# Patient Record
Sex: MALE | Race: WHITE | NOT HISPANIC OR LATINO | URBAN - METROPOLITAN AREA
[De-identification: names, ages, dates, MRNs, and addresses within clinical notes are randomized per-mention and may not be internally consistent; named-entity substitution may affect disease eponyms.]

---

## 2017-10-26 ENCOUNTER — OFFICE VISIT - RIVER FALLS (OUTPATIENT)
Dept: FAMILY MEDICINE | Facility: CLINIC | Age: 32
End: 2017-10-26

## 2018-01-08 ENCOUNTER — AMBULATORY - RIVER FALLS (OUTPATIENT)
Dept: FAMILY MEDICINE | Facility: CLINIC | Age: 33
End: 2018-01-08

## 2018-05-30 ENCOUNTER — OFFICE VISIT - RIVER FALLS (OUTPATIENT)
Dept: FAMILY MEDICINE | Facility: CLINIC | Age: 33
End: 2018-05-30

## 2018-05-30 ASSESSMENT — MIFFLIN-ST. JEOR: SCORE: 2331.51

## 2018-06-11 ENCOUNTER — AMBULATORY - RIVER FALLS (OUTPATIENT)
Dept: FAMILY MEDICINE | Facility: CLINIC | Age: 33
End: 2018-06-11

## 2018-07-03 ENCOUNTER — OFFICE VISIT - RIVER FALLS (OUTPATIENT)
Dept: FAMILY MEDICINE | Facility: CLINIC | Age: 33
End: 2018-07-03

## 2018-07-03 ASSESSMENT — MIFFLIN-ST. JEOR: SCORE: 2323.35

## 2018-07-12 ENCOUNTER — AMBULATORY - RIVER FALLS (OUTPATIENT)
Dept: FAMILY MEDICINE | Facility: CLINIC | Age: 33
End: 2018-07-12

## 2018-09-04 ENCOUNTER — OFFICE VISIT - RIVER FALLS (OUTPATIENT)
Dept: FAMILY MEDICINE | Facility: CLINIC | Age: 33
End: 2018-09-04

## 2018-09-04 ASSESSMENT — MIFFLIN-ST. JEOR: SCORE: 2313.37

## 2022-02-11 VITALS
WEIGHT: 289.6 LBS | BODY MASS INDEX: 37.17 KG/M2 | HEART RATE: 64 BPM | HEIGHT: 74 IN | SYSTOLIC BLOOD PRESSURE: 124 MMHG | DIASTOLIC BLOOD PRESSURE: 72 MMHG

## 2022-02-11 VITALS
OXYGEN SATURATION: 96 % | WEIGHT: 289 LBS | SYSTOLIC BLOOD PRESSURE: 130 MMHG | HEART RATE: 84 BPM | TEMPERATURE: 98 F | BODY MASS INDEX: 37.11 KG/M2 | DIASTOLIC BLOOD PRESSURE: 86 MMHG

## 2022-02-11 VITALS
WEIGHT: 291.8 LBS | HEART RATE: 74 BPM | DIASTOLIC BLOOD PRESSURE: 62 MMHG | HEIGHT: 74 IN | BODY MASS INDEX: 37.45 KG/M2 | SYSTOLIC BLOOD PRESSURE: 114 MMHG

## 2022-02-11 VITALS
HEIGHT: 74 IN | BODY MASS INDEX: 37.68 KG/M2 | WEIGHT: 293.6 LBS | DIASTOLIC BLOOD PRESSURE: 64 MMHG | SYSTOLIC BLOOD PRESSURE: 124 MMHG | HEART RATE: 64 BPM

## 2022-02-16 NOTE — TELEPHONE ENCOUNTER
---------------------  From: Ling Palacios CMA (Phone Messages Pool ([id number]_[facility]))   To: [facility] Message Pool ([id number]);     Sent: 9/12/2019 1:50:49 PM CDT  Subject: Recall       Phone Message    PCP:  SHERIE      Time of Call:  1:34       Person Calling:  Pt  Phone number:  [phone]    Note:  Patient received recall letter for Sleep Apnea f/u.  He has moved to [place], [place] so will not be scheduling.  He asked if you had any recommendations for sleep clinics/HCP in his area.      Transferred to: [place] pool---------------------  From: Ashley Pardo CMA ([facility] Message Pool ([id number]_[facility]))   To: Spencer Bush MD;     Sent: 9/12/2019 1:51:53 PM CDT  Subject: FW: Recall---------------------  From: Spencer Bush MD   To: [facility] Message Pool ([id number]_[place]);     Sent: 9/12/2019 2:09:50 PM CDT  Subject: RE: Recall     unfortunately I [place]. He asked if I knew a way to look for a covered MD for him. I told him that he should check on insurances website. A lot of them will allow him to look up covered phyisicans in his area under his plan and that they should have a section for sleep. Pt states he will try that and if that doesn't work he will call them.

## 2022-02-16 NOTE — PROGRESS NOTES
Patient:   ANAM DOBBINS            MRN: [id number]            FIN: [id number]               Age:   32 years     Sex:  Male     :  1985   Associated Diagnoses:   Alopecia; Bronchitis; Change in mole   Author:   Anna Guerrero      Visit Information      Primary Care Provider (PCP):  Juno Greenwood MD# [id number]      Chief Complaint   10/26/2017 11:57 AM CDT  Pt c/o dry cough x 3 weeks. Thought he had flu at the start of sx.        History of Present Illness   PPC for evaluation of dry hacking cough present for 3 weeks, fever at start but no fever for 2 weeks  does not smoke, no hx of asthma, no hx of blood clots, only travel was 4 weeks ago when he went to [place] for [place]TI dept but no known exposures, no small children at home    in addition, has had alopecia for 7yrs, has mole on top of left ear which he was told to watch, no changes to this lesion  however, has mole in right axilla which is larger, no bleeding      Review of Systems   Constitutional:  Negative.    Eye:  Negative.    Ear/Nose/Mouth/Throat:  Nasal congestion, Sore throat.    Respiratory:  Cough, Wheezing.    Cardiovascular:  Negative.    Gastrointestinal:  Negative.    Hematology/Lymphatics:  Negative.    Immunologic:  Negative.    Musculoskeletal:  Negative.    Integumentary:  Negative except as documented in history of present illness.    Neurologic:  Negative.    Psychiatric:  Negative.             Health Status   Allergies:    Allergic Reactions (Selected)  Severity Not Documented  Amoxicillin (No reactions were documented)  Cats (No reactions were documented)  Dogs (No reactions were documented)  Penicillin (No reactions were documented)   Medications:  (Selected)   Prescriptions  Prescribed  Zithromax 250 mg oral tablet: 1 packet(s), PO, Once, Instructions: as directed on package labeling, # 6 tab(s), 0 Refill(s), Type: Soft Stop, Pharmacy: [id number], 1 packet(s) po once,Instr:as directed  on package labeling  Documented Medications  Documented  FISH OIL ORAL CAPSULE ([id number]): UNKNOWNUNIT, Not Listed, 0 Refill(s)  Vitamin D3: po, daily, 0 Refill(s), Type: Maintenance      Histories   Family History:    Diabetes mellitus  Father        Physical Examination   Vital Signs   10/26/2017 11:57 AM CDT Temperature Tympanic 98.0 DegF    Peripheral Pulse Rate 84 bpm    Pulse Site Radial artery    HR Method Manual    Systolic Blood Pressure 130 mmHg    Diastolic Blood Pressure 86 mmHg    Mean Arterial Pressure 101 mmHg    BP Site Right arm    BP Method Manual    Oxygen Saturation 96 %      Measurements from flowsheet : Measurements   10/26/2017 11:57 AM CDT  Weight Measured - Standard                289 lb     General:  Alert and oriented, No acute distress.    Eye:  Pupils are equal, round and reactive to light.    HENT:  Normocephalic.         Head: normocephalic.         Ear: Both ears, Middle ear, Tympanic membrane ( Fluid in middle ear, bilaterally ).         Nose: Both nostrils, erythematous, clear discharge.         Sinus: Bilateral, Frontal sinus, Maxillary sinus, Within normal limits.         Mouth: Within normal limits.         Throat: Pharynx ( Erythematous, moderate amount clear post nasal discharge ).         Glands: Bilateral.    Neck:  Supple.    Respiratory:       Breath sounds: Bilateral, Posterior, Lower lobe, Base, Expiratory wheezes, No inspiratory wheezes.    Cardiovascular:  Normal rate, Regular rhythm.    Gastrointestinal:  Soft, Non-tender.    Integumentary:  Warm, Dry, Pink, 4mm oval mole on top of left ear pinnacle, tan   right axillar with 3mm raised dark tan mole and 4mm surrounding tan skin change which is macular and irregular.    Neurologic:  Alert, Oriented, Normal sensory.    Psychiatric:  Cooperative, Appropriate mood & affect.       Impression and Plan   Diagnosis     Alopecia (GQL94-NM L65.9).     Bronchitis (WOH26-HR J40).     Change in mole (WJX98-IV L81.9).     Patient  "Instructions:       Counseled: Patient, Regarding diagnosis, Regarding treatment, Regarding medications, Regarding activity, Verbalized understanding.    Summary:  discussed interventions for wheezing does not want an inhaler or steroids at this time, \"this happened about ten years ago and zithromax was enough\"  discussed limitations with zithromax as most of bronchitis cases are viral but has he had traveled 4 weeks ago I am unsure of exposures  will send him to dermatology to establish care, important with alopecia to have moles evaluated..    Orders     Orders (Selected)   Prescriptions  Prescribed  Zithromax 250 mg oral tablet: 1 packet(s), PO, Once, Instructions: as directed on package labeling, # 6 tab(s), 0 Refill(s), Type: Soft Stop, Pharmacy: [id number], 1 packet(s) po once,Instr:as directed on package labeling.  "

## 2022-02-16 NOTE — LETTER
(Inserted Image. Unable to display)       September 05, 2019      ANAM DOBBINS  [address] [place], [place] [id number]          Dear ANAM,      Thank you for selecting [facility] for your healthcare needs.     Our records indicate you are due for the following services:     Sleep Apnea Follow up ~ It is recommended, and possibly required by your insurance plan, that you see one of our sleep physicians, Dr. Manny Bey or Dr. Kalin Bush, on a yearly basis for your sleep apnea.  To determine whether you are benefiting from your PAP therapy, a download of your machine will be needed.  We may be able to obtain the download remotely; however, to ensure this information will be available for your visit, please bring your CPAP machine and SD card to your visit.    Appointments with our sleep physicians can be made by calling your primary clinic.    To schedule an appointment or if you have further questions, please contact your primary clinic:   [facility]       [phone]   [facility]       [phone]              [facility]     [phone]    Powered by [employer]    Sincerely,    Healthcare Providers at [facility]

## 2022-02-16 NOTE — PROGRESS NOTES
Patient:   ANAM DOBBINS            MRN: [id number]            FIN: [id number]               Age:   32 years     Sex:  Male     :  1985   Associated Diagnoses:   Witnessed episode of apnea; Obesity; Excessive daytime sleepiness; Snoring   Author:   Spencer Bush MD      Chief Complaint   2018 8:04 AM CDT    c/o snoring , wakes up a lot during the night, has had witnessed apnea, tired during the day.      History of Present Illness   see chief complaint as noted above and confirmed with the patient   32 year old male presenting with consult for sleep. His fiance notices that he stops breathing in his sleep. He wakes up anywhere from 1-5 times most nights. Nights he does sleep fully through without waking he still feels very tired when he wakes up and throughout the day. He snores loudly on his back. He is getting  in 6 months and plans to loose weight.      Review of Systems   Constitutional:  No fever, No chills.    Ear/Nose/Mouth/Throat:  Negative.    Respiratory:  Apnea, Snoring, No shortness of breath, No cough.    Cardiovascular:  No chest pain.    Gastrointestinal:  No nausea, No vomiting.    Musculoskeletal:  No back pain.    Integumentary:  No rash.    Neurologic:  No headache.    Psychiatric:  Negative.              Health Status   Allergies:    Allergic Reactions (Selected)  Severity Not Documented  Amoxicillin (No reactions were documented)  Cats (No reactions were documented)  Dogs (No reactions were documented)  Penicillin (No reactions were documented)   Medications:  (Selected)   Documented Medications  Documented  FISH OIL ORAL CAPSULE ([id number]): UNKNOWNUNIT, Not Listed, 0 Refill(s)  Vitamin D3: po, daily, 0 Refill(s), Type: Maintenance   Problem list:    All Problems  Plantar Wart / ICD-9-.12 / Confirmed  Other and Unspecified Hyperlipidemia / ICD-9-.4 / Confirmed  Other Abnormal Blood Chemistry / ICD-9-.6 / Confirmed  Obesity, Unspecified / ICD-9-.00 /  Probable  Colitis, Enteritis, and Gastroenteritis of Presumed Infectious Origin / ICD-9-.1 / Confirmed  Allergy, Unspecified, Not Elsewhere Classified / ICD-9-.3 / Confirmed      Histories   Past Medical History:    Active  Allergy, Unspecified, Not Elsewhere Classified (995.3)  Comments:      -   carries epi pen    12/20/2016 CST 12:29 PM CST - Abi Wharton  Anaphylactic reaction to bug bites.   Family History:    Diabetes mellitus  Father     Procedure history:    Colonoscopy ([id number]).   Social History:        Alcohol Assessment: Current            3-5 times per week                     Comments:                      01/26/2017 - Abi Wharton                     1-2 beers      Tobacco Assessment: Denies Tobacco Use      Substance Abuse Assessment: Denies Substance Abuse      Employment and Education Assessment            Employed, Work/School description: .      Exercise and Physical Activity Assessment: Regular exercise            Exercise frequency: 2 x a wk..  Exercise type: Brazilian Viu-Jitsu.        Physical Examination   Vital Signs   5/30/2018 8:04 AM CDT Peripheral Pulse Rate 64 bpm    Systolic Blood Pressure 124 mmHg    Diastolic Blood Pressure 64 mmHg    Mean Arterial Pressure 84 mmHg      Measurements from flowsheet : Measurements   5/30/2018 8:04 AM CDT Height Measured - Standard 74 in    Weight Measured - Standard 293.6 lb    BSA 2.63 m2    Body Mass Index 37.69 kg/m2  HI      General:  Alert and oriented, No acute distress.    Eye:  Pupils are equal, round and reactive to light, Normal conjunctiva.    HENT:  Oral mucosa is moist.    Neck:  Supple.    Respiratory:  Respirations are non-labored.    Cardiovascular:  Normal rate, Regular rhythm, No edema.    Gastrointestinal:  Non-distended.    Musculoskeletal:  Normal gait.    Integumentary:  Warm, No rash.    Psychiatric:  Cooperative, Appropriate mood & affect, Normal judgment.       Review / Management    Results review      Impression and Plan   Diagnosis     Witnessed episode of apnea (NJJ26-KW R06.81).     Obesity (OGU12-BD E66.9).     Excessive daytime sleepiness (IGZ92-PI G47.19).     Snoring (XIC49-BH R06.83).     Plan:  encouraged goal of weight loss, Will have him do a sleep study and follow up a week after testing is done. Reviewed expected course, what to watch for, and when to return.   I, Amy Pardo [facility], acted solely as a scribe for, and in the presence of Dr. Spencer Bush who performed the service..

## 2022-02-16 NOTE — PROGRESS NOTES
Patient:   ANAM DOBBINS            MRN: [id number]            FIN: [id number]               Age:   32 years     Sex:  Male     :  1985   Associated Diagnoses:   Obesity; Mild obstructive sleep apnea   Author:   Spencer Bush MD      Chief Complaint   7/3/2018 8:04 AM CDT     f/u sleep study      History of Present Illness   Patient presents in follow up after having a polysomnogram on 18  Reason for test:  witnessed apnea, wakes up 5-6 times a night, unrefreshing sleep, weight gain, chronic snoring.    Results / AHI:  11.2 per hour         Review of Systems   Constitutional:  Fatigue, No fever, No chills.    Ear/Nose/Mouth/Throat:  Negative.    Respiratory:  Sleep apnea, Snoring, No shortness of breath, No cough.    Cardiovascular:  No chest pain.    Gastrointestinal:  No nausea, No vomiting.    Musculoskeletal:  No back pain.    Integumentary:  No rash.    Neurologic:  No headache.    Psychiatric:  Sleeping problems.              Health Status   Allergies:    Allergic Reactions (Selected)  Severity Not Documented  Amoxicillin (No reactions were documented)  Cats (No reactions were documented)  Dogs (No reactions were documented)  Penicillin (No reactions were documented)   Medications:  (Selected)   Documented Medications  Documented  FISH OIL ORAL CAPSULE ([id number]): UNKNOWNUNIT, Not Listed, 0 Refill(s)  Vitamin D3: po, daily, 0 Refill(s), Type: Maintenance   Problem list:    All Problems  Plantar Wart / ICD-9-.12 / Confirmed  Other and Unspecified Hyperlipidemia / ICD-9-.4 / Confirmed  Other Abnormal Blood Chemistry / ICD-9-.6 / Confirmed  Obesity, Unspecified / ICD-9-.00 / Probable  Colitis, Enteritis, and Gastroenteritis of Presumed Infectious Origin / ICD-9-.1 / Confirmed  Allergy, Unspecified, Not Elsewhere Classified / ICD-9-.3 / Confirmed      Histories   Past Medical History:    Active  Allergy, Unspecified, Not Elsewhere Classified (995.3)  Comments:       -   carries epi pen    12/20/2016 CST 12:29 PM CST - Abi Wharton  Anaphylactic reaction to bug bites.   Family History:    Diabetes mellitus  Father     Procedure history:    Colonoscopy ([id number]).   Social History:        Alcohol Assessment: Current            3-5 times per week                     Comments:                      01/26/2017 - Abi Wharton                     1-2 beers      Tobacco Assessment: Denies Tobacco Use      Substance Abuse Assessment: Denies Substance Abuse      Employment and Education Assessment            Employed, Work/School description: .      Exercise and Physical Activity Assessment: Regular exercise            Exercise frequency: 2 x a wk..  Exercise type: Brazilian Viu-Jitsu.        Physical Examination   Vital Signs   7/3/2018 8:04 AM CDT Peripheral Pulse Rate 74 bpm    Systolic Blood Pressure 114 mmHg    Diastolic Blood Pressure 62 mmHg    Mean Arterial Pressure 79 mmHg      Measurements from flowsheet : Measurements   7/3/2018 8:04 AM CDT Height Measured - Standard 74 in    Weight Measured - Standard 291.8 lb    BSA 2.63 m2    Body Mass Index 37.46 kg/m2  HI      General:  Alert and oriented, No acute distress.    Eye:  Pupils are equal, round and reactive to light, Normal conjunctiva.    HENT:  Oral mucosa is moist.    Neck:  Supple.    Respiratory:  Respirations are non-labored.    Cardiovascular:  Normal rate, Regular rhythm, No edema.    Gastrointestinal:  Non-distended.    Musculoskeletal:  Normal gait.    Integumentary:  Warm, No rash.    Psychiatric:  Cooperative, Appropriate mood & affect, Normal judgment.       Impression and Plan   Diagnosis     Obesity (GZI37-GL E66.9).     Mild obstructive sleep apnea (AJZ17-EL G47.33).     Course:  Reviewed patients study and its significance..    Plan:  Patient with obstructive sleep apnea.  Discussed overall effects of BRANDON as well as goals of therapy and obstacles to therapy. Treatment options  are discussed.  Patient agrees to try PAP therapy and has been referred for set up.  Reviewed the importance of CPAP adherence to reduce daytime sleepiness and prevent excess mortality associated with sleep apnea. He will continue to work on trying to loose weight.  Will follow up in approximately 2 months to determine effectiveness of therapy.   CLAUDIA Cassie McRoberts CMA, acted solely as a scribe for, and in the presence of Dr. Spencer Bush who performed the service..

## 2022-02-16 NOTE — PROGRESS NOTES
Patient:   ANAM DOBBINS            MRN: [id number]            FIN: [id number]               Age:   32 years     Sex:  Male     :  1985   Associated Diagnoses:   Obesity; Mild obstructive sleep apnea   Author:   Spencer Bush MD      Chief Complaint   2018 8:00 AM CDT     f/u Sleep Apnea- adjusting well      History of Present Illness   See chief complaint as noted above and confirmed with the patient   32 year old patient who presents today for follow up of their obstructive sleep apnea.    Original Test Date:  18  Results/ AHI:  6.7 per hr  Machine Type:  Dreamstation Auto CPAP  Mask:  Wisp   Compliance:   Uses regularly and finds benefit. He has adjusted to using very well and is feeling it's working well. He is not waking up as much at night and feels more refreshed during the day.      Review of Systems   Constitutional:  No fever, No fatigue.    Ear/Nose/Mouth/Throat:  Negative.    Respiratory:  Sleep apnea, No shortness of breath, No cough.         Device use: CPAP.    Cardiovascular:  No chest pain.    Gastrointestinal:  No nausea, No vomiting.    Musculoskeletal:  No back pain.    Integumentary:  No rash.    Neurologic:  Negative.    Psychiatric:  No sleeping problems.              Health Status   Allergies:    Allergic Reactions (Selected)  Severity Not Documented  Amoxicillin (No reactions were documented)  Cats (No reactions were documented)  Dogs (No reactions were documented)  Penicillin (No reactions were documented)   Medications:  (Selected)   Prescriptions  Prescribed  Auto-Titrating CPAP 6-16: Auto-Titrating CPAP 6-16, See Instructions, Instructions: Heated humidifier, heated tubing, filters, nasal or full face mask of choice with headgear.  Replacement cushions and supplies as needed.  Months = 99 (Lifetime), Supply, # 1 EA, 0 Refill(s), T...  Documented Medications  Documented  FISH OIL ORAL CAPSULE ([id number]): UNKNOWNUNIT, Not Listed, 0 Refill(s)  Vitamin D3: po, daily, 0  Refill(s), Type: Maintenance   Problem list:    All Problems  Plantar Wart / ICD-9-.12 / Confirmed  Other and Unspecified Hyperlipidemia / ICD-9-.4 / Confirmed  Other Abnormal Blood Chemistry / ICD-9-.6 / Confirmed  Mild obstructive sleep apnea / SNOMED CT 743957791 / Confirmed  Obesity, Unspecified / ICD-9-.00 / Probable  Obesity / SNOMED CT 0433359057 / Probable  Colitis, Enteritis, and Gastroenteritis of Presumed Infectious Origin / ICD-9-.1 / Confirmed  Allergy, Unspecified, Not Elsewhere Classified / ICD-9-.3 / Confirmed      Histories   Past Medical History:    Active  Allergy, Unspecified, Not Elsewhere Classified (995.3)  Comments:      -   carries epi pen    12/20/2016 CST 12:29 PM CST - Abi Wharton  Anaphylactic reaction to bug bites.   Family History:    Diabetes mellitus  Father     Procedure history:    Colonoscopy ([id number]).   Social History:        Alcohol Assessment: Current            3-5 times per week                     Comments:                      01/26/2017 - Abi Wharton                     1-2 beers      Tobacco Assessment: Denies Tobacco Use      Substance Abuse Assessment: Denies Substance Abuse      Employment and Education Assessment            Employed, Work/School description: .      Exercise and Physical Activity Assessment: Regular exercise            Exercise frequency: 2 x a wk..  Exercise type: Brazilian Viu-Jitsu.        Physical Examination   Vital Signs   9/4/2018 8:00 AM CDT Peripheral Pulse Rate 64 bpm    Systolic Blood Pressure 124 mmHg    Diastolic Blood Pressure 72 mmHg    Mean Arterial Pressure 89 mmHg      Measurements from flowsheet : Measurements   9/4/2018 8:00 AM CDT Height Measured - Standard 74 in    Weight Measured - Standard 289.6 lb    BSA 2.62 m2    Body Mass Index 37.18 kg/m2  HI      General:  Alert and oriented, No acute distress.    Eye:  Pupils are equal, round and reactive to light,  Normal conjunctiva.    HENT:  Oral mucosa is moist.    Neck:  Supple.    Respiratory:  Respirations are non-labored.    Cardiovascular:  Normal rate, Regular rhythm, No edema.    Gastrointestinal:  Non-distended.    Musculoskeletal:  Normal gait.    Integumentary:  Warm, No rash.    Psychiatric:  Cooperative, Appropriate mood & affect, Normal judgment.       Review / Management   Results review      Impression and Plan   Diagnosis     Obesity (FVX66-DV E66.9).     Mild obstructive sleep apnea (NFP51-QL G47.33).     Plan:  Patient reports good compliance.  Download results:  80 %  Patient compliantly using & benefitting from CPAP and should continue to use CPAP therapy as directed.  Patient plans to continue using CPAP.  Encouraged to keep equipment up to date and consider yearly downloads.    [facility], acted solely as a scribe for, and in the presence of Dr. Spencer Bush who performed the service..